# Patient Record
Sex: FEMALE | URBAN - METROPOLITAN AREA
[De-identification: names, ages, dates, MRNs, and addresses within clinical notes are randomized per-mention and may not be internally consistent; named-entity substitution may affect disease eponyms.]

---

## 2019-04-05 ENCOUNTER — OFFICE VISIT (OUTPATIENT)
Dept: URBAN - METROPOLITAN AREA CLINIC 23 | Facility: CLINIC | Age: 79
End: 2019-04-05
Payer: MEDICARE

## 2019-04-05 PROCEDURE — 92004 COMPRE OPH EXAM NEW PT 1/>: CPT | Performed by: OPTOMETRIST

## 2019-04-05 ASSESSMENT — KERATOMETRY
OD: 42.63
OS: 42.63

## 2019-04-05 ASSESSMENT — VISUAL ACUITY
OS: 20/50
OD: 20/30

## 2019-04-05 ASSESSMENT — INTRAOCULAR PRESSURE
OS: 12
OD: 12

## 2019-04-10 ENCOUNTER — OFFICE VISIT (OUTPATIENT)
Dept: URBAN - METROPOLITAN AREA CLINIC 23 | Facility: CLINIC | Age: 79
End: 2019-04-10
Payer: MEDICARE

## 2019-04-10 DIAGNOSIS — H25.813 COMBINED FORMS OF AGE-RELATED CATARACT, BILATERAL: Primary | ICD-10-CM

## 2019-04-10 PROCEDURE — 99204 OFFICE O/P NEW MOD 45 MIN: CPT | Performed by: OPHTHALMOLOGY

## 2019-04-10 PROCEDURE — 99214 OFFICE O/P EST MOD 30 MIN: CPT | Performed by: OPHTHALMOLOGY

## 2019-04-10 RX ORDER — OFLOXACIN 3 MG/ML
0.3 % SOLUTION/ DROPS OPHTHALMIC
Qty: 5 | Refills: 1 | Status: INACTIVE
Start: 2019-04-10 | End: 2019-05-01

## 2019-04-10 RX ORDER — PREDNISOLONE ACETATE 10 MG/ML
1 % SUSPENSION/ DROPS OPHTHALMIC
Qty: 10 | Refills: 1 | Status: ACTIVE
Start: 2019-04-10

## 2019-04-10 ASSESSMENT — INTRAOCULAR PRESSURE
OS: 15
OD: 14

## 2019-04-10 ASSESSMENT — KERATOMETRY
OS: 42.75
OD: 42.63

## 2019-04-10 NOTE — IMPRESSION/PLAN
Impression: Combined forms of age-related cataract, bilateral: H25.813. Condition: quality of life issue. Symptoms: could improve with surgery. Vision: vision affected. Plan: Cataracts account for the patient's complaints. Discussed all risks, benefits, procedures and recovery. Patient understands changing glasses will not improve vision. Patient desires to have surgery, recommend phacoemulsification with intraocular lens.  RL-2 Recommend standard IOL Aim plano

## 2019-04-12 ENCOUNTER — PRE-OPERATIVE VISIT (OUTPATIENT)
Dept: URBAN - METROPOLITAN AREA CLINIC 23 | Facility: CLINIC | Age: 79
End: 2019-04-12
Payer: MEDICARE

## 2019-04-12 PROCEDURE — 92136 OPHTHALMIC BIOMETRY: CPT | Performed by: OPHTHALMOLOGY

## 2019-04-12 ASSESSMENT — PACHYMETRY
OS: 3.55
OD: 24.28
OD: 3.55
OS: 24.05

## 2019-04-19 ENCOUNTER — SURGERY (OUTPATIENT)
Dept: URBAN - METROPOLITAN AREA SURGERY 11 | Facility: SURGERY | Age: 79
End: 2019-04-19
Payer: MEDICARE

## 2019-04-19 PROCEDURE — 66984 XCAPSL CTRC RMVL W/O ECP: CPT | Performed by: OPHTHALMOLOGY

## 2019-04-20 ENCOUNTER — POST-OPERATIVE VISIT (OUTPATIENT)
Dept: URBAN - METROPOLITAN AREA CLINIC 23 | Facility: CLINIC | Age: 79
End: 2019-04-20

## 2019-04-20 DIAGNOSIS — Z09 ENCNTR FOR F/U EXAM AFT TRTMT FOR COND OTH THAN MALIG NEOPLM: Primary | ICD-10-CM

## 2019-04-20 PROCEDURE — 99024 POSTOP FOLLOW-UP VISIT: CPT | Performed by: OPTOMETRIST

## 2019-04-20 ASSESSMENT — INTRAOCULAR PRESSURE
OS: 11
OD: 14

## 2019-05-01 ENCOUNTER — POST-OPERATIVE VISIT (OUTPATIENT)
Dept: URBAN - METROPOLITAN AREA CLINIC 23 | Facility: CLINIC | Age: 79
End: 2019-05-01

## 2019-05-01 PROCEDURE — 99024 POSTOP FOLLOW-UP VISIT: CPT | Performed by: OPTOMETRIST

## 2019-05-01 ASSESSMENT — INTRAOCULAR PRESSURE
OS: 13
OD: 14

## 2023-01-19 NOTE — IMPRESSION/PLAN
[address] [facility]  [address] [address] [address]  [address] [address]  Dept: [phone]  Dept Fax: [phone]  Loc: [phone]      Subjective:     Chief Complaint   Patient presents with    \[facility]\"" Care       HPI:  Arcadio Dudley is a 61 y.o. female presents today to get establish as a new patient. She's a former Dr Fitz Toscano patient. She has had HA, body aches, cough, cold symptoms x 1 week. No fever. Cough is productive of thick discolored grayish, green mucus. Pt states she does not smoke. No known ill contacts    ROS:   Review of Systems   Constitutional:  Positive for fatigue. Negative for fever. HENT:  Positive for congestion, postnasal drip, rhinorrhea, sore throat and voice change. Respiratory:  Positive for cough and chest tightness. Cardiovascular: Negative. Gastrointestinal: Negative. Musculoskeletal:  Positive for back pain and myalgias. Skin:  Negative for rash. Neurological:  Positive for headaches.      PMHx:  Past Medical History:   Diagnosis Date    Alcohol abuse     Anxiety     Arthritis     Back pain     Chronic GERD     Depression     Hypertension     Marijuana smoker     Scoliosis      Patient Active Problem List   Diagnosis    Alcohol abuse with intoxication with complication ([address]    Urinary tract bacterial infections    Major depressive disorder, recurrent severe without psychotic features ([address]    Chronic midline low back pain without sciatica    Chest pain    Anxiety    Essential hypertension    RUQ pain    Belching    Constipation    Mixed hyperlipidemia    Muscle spasms of lower extremity    Spasm of thoracic back muscle    Acquired scoliosis    Upper respiratory tract infection    Cocaine abuse (HCC)       PSHx:  Past Surgical History:   Procedure Laterality Date    HYSTERECTOMY (CERVIX STATUS UNKNOWN)         PFHx:  Family History   Problem Relation Age of Onset    Colon Cancer Neg Hx     Esophageal Cancer Neg Hx     Liver Impression: Combined forms of age-related cataract, bilateral: H25.813. Plan: Cataracts account for the patient's complaints. Patient understands changing glasses will not improve vision. Patient desires to have surgery, recommend surgical consult. Briefly discussed advanced technology options. OS only. Pt would like monovision to match current vision. Cancer Neg Hx     Rectal Cancer Neg Hx     Stomach Cancer Neg Hx        SocialHx:  Social History     Tobacco Use    Smoking status: Never    Smokeless tobacco: Never   Substance Use Topics    Alcohol use: Yes     Alcohol/week: 4.0 standard drinks     Types: 2 Cans of beer, 2 Shots of liquor per week       Allergies:   Allergies   Allergen Reactions    Codeine Nausea Only       Medications:  Current Outpatient Medications   Medication Sig Dispense Refill    meloxicam (MOBIC) 7.5 MG tablet TAKE 1 TABLET BY MOUTH DAILY 30 tablet 0    promethazine-dextromethorphan (PROMETHAZINE-DM) 6.25-15 MG/5ML syrup Take 5 mLs by mouth 4 times daily as needed for Cough 100 mL 0    atenolol (TENORMIN) 25 MG tablet Take 1 tablet by mouth daily 9 tablet 0    lisinopril (PRINIVIL;ZESTRIL) 10 MG tablet Take 2 tablets by mouth daily 18 tablet 0    hydrocortisone (ANUSOL-HC) 2.5 % CREA rectal cream Place rectally 2 times daily      vitamin B-12 (CYANOCOBALAMIN) 500 MCG tablet TAKE 1 TABLET BY MOUTH DAILY 30 tablet 0    celecoxib (CELEBREX) 200 MG capsule Take 1 capsule by mouth daily 60 capsule 3    Dextromethorphan-guaiFENesin (MUCINEX DM)  MG TB12 Take 1 tablet by mouth 2 times daily 28 tablet 0    ondansetron (ZOFRAN) 4 MG tablet Take 1 tablet by mouth 3 times daily as needed for Nausea or Vomiting (Patient taking differently: Take 4 mg by mouth 3 times daily as needed for Nausea or Vomiting Prn) 30 tablet 0    loratadine (CLARITIN) 10 MG tablet Take 1 tablet by mouth daily 30 tablet 2    PROAIR  (90 Base) MCG/ACT inhaler INHALE 2 PUFFS BY MOUTH EVERY 6 HOURS AS NEEDED FOR WHEEZING 8.5 g 0    famotidine (PEPCID) 20 MG tablet Take 20 mg by mouth 2 times daily      polyethylene glycol (GLYCOLAX) 17 GM/SCOOP powder Take 17 g by mouth daily 1 Bottle 11    atorvastatin (LIPITOR) 40 MG tablet Take 1 tablet by mouth daily 30 tablet 3    cyclobenzaprine (FLEXERIL) 10 MG tablet Take 10 mg by mouth 3 times daily as needed for Muscle spasms       No current facility-administered medications for this visit.       Objective:   PE:  /82   Pulse 73   Temp 97.1 °F (36.2 °C)   Ht 5' 1.5\" (1.562 m)   Wt 166 lb (75.3 kg)   SpO2 97%   BMI 30.86 kg/m²   Physical Exam  Constitutional:       Appearance: She is obese. She is ill-appearing.      Comments: cough productive   HENT:      Head: Normocephalic.      Right Ear: Tympanic membrane, ear canal and external ear normal.      Left Ear: Tympanic membrane, ear canal and external ear normal.      Nose: Congestion and rhinorrhea present.      Mouth/Throat:      Comments: post pharynx mildly red  Eyes:      General: No scleral icterus.  Cardiovascular:      Rate and Rhythm: Regular rhythm.      Heart sounds: Normal heart sounds.   Pulmonary:      Breath sounds: Rhonchi present. No wheezing or rales.   Abdominal:      Palpations: Abdomen is soft.      Comments: obese   Musculoskeletal:      Cervical back: Neck supple.   Lymphadenopathy:      Cervical: No cervical adenopathy.   Skin:     Findings: No lesion or rash.   Neurological:      Mental Status: She is alert and oriented to person, place, and time.          Assessment & Plan   Wendi was seen today for establish care.    Diagnoses and all orders for this visit:    Acute cough  -     POCT Influenza A/B  -     promethazine-dextromethorphan (PROMETHAZINE-DM) 6.25-15 MG/5ML syrup; Take 5 mLs by mouth 4 times daily as needed for Cough  -     XR CHEST STANDARD (2 VW); Future    Chest congestion  -     POCT Influenza A/B  -     XR CHEST STANDARD (2 VW); Future    Chronic midline back pain, unspecified back location  -     meloxicam (MOBIC) 7.5 MG tablet; TAKE 1 TABLET BY MOUTH DAILY      Return in about 2 weeks (around 2/2/2023) for routine follow-up with PCP, extended office visit.    All questions were answered.  Medications, including possible adverse effects, and instructions were reviewed and  understanding was confirmed.   Follow-up  recommendations, including when to contact or return to office (ie; if symptoms worsen or fail to improve), were discussed and acknowledged.     Electronically signed by Carlos Leavitt MD on 1/19/23 at 1:52 PM CST